# Patient Record
Sex: FEMALE | Race: OTHER | Employment: STUDENT | URBAN - METROPOLITAN AREA
[De-identification: names, ages, dates, MRNs, and addresses within clinical notes are randomized per-mention and may not be internally consistent; named-entity substitution may affect disease eponyms.]

---

## 2022-01-04 ENCOUNTER — HOSPITAL ENCOUNTER (EMERGENCY)
Age: 22
Discharge: HOME | End: 2022-01-04
Attending: PEDIATRICS
Payer: COMMERCIAL

## 2022-01-04 ENCOUNTER — APPOINTMENT (OUTPATIENT)
Dept: CT IMAGING | Age: 22
End: 2022-01-04
Attending: PEDIATRICS
Payer: COMMERCIAL

## 2022-01-04 VITALS
HEART RATE: 123 BPM | OXYGEN SATURATION: 97 % | DIASTOLIC BLOOD PRESSURE: 74 MMHG | WEIGHT: 86.86 LBS | RESPIRATION RATE: 20 BRPM | SYSTOLIC BLOOD PRESSURE: 123 MMHG | TEMPERATURE: 101.4 F

## 2022-01-04 DIAGNOSIS — J03.90 ACUTE TONSILLITIS, UNSPECIFIED ETIOLOGY: Primary | ICD-10-CM

## 2022-01-04 DIAGNOSIS — J36 PERITONSILLAR ABSCESS: ICD-10-CM

## 2022-01-04 LAB
ALBUMIN SERPL-MCNC: 4.2 G/DL (ref 3.5–5)
ALBUMIN/GLOB SERPL: 1 {RATIO} (ref 1.1–2.2)
ALP SERPL-CCNC: 94 U/L (ref 45–117)
ALT SERPL-CCNC: 16 U/L (ref 12–78)
ANION GAP SERPL CALC-SCNC: 7 MMOL/L (ref 5–15)
AST SERPL-CCNC: 17 U/L (ref 15–37)
BASOPHILS # BLD: 0 K/UL (ref 0–0.1)
BASOPHILS NFR BLD: 0 % (ref 0–1)
BILIRUB SERPL-MCNC: 0.7 MG/DL (ref 0.2–1)
BLASTS NFR BLD MANUAL: 0 %
BUN SERPL-MCNC: 9 MG/DL (ref 6–20)
BUN/CREAT SERPL: 11 (ref 12–20)
CALCIUM SERPL-MCNC: 9.8 MG/DL (ref 8.5–10.1)
CHLORIDE SERPL-SCNC: 105 MMOL/L (ref 97–108)
CO2 SERPL-SCNC: 24 MMOL/L (ref 21–32)
COMMENT, HOLDF: NORMAL
CREAT SERPL-MCNC: 0.83 MG/DL (ref 0.55–1.02)
DIFFERENTIAL METHOD BLD: ABNORMAL
EOSINOPHIL # BLD: 0 K/UL (ref 0–0.4)
EOSINOPHIL NFR BLD: 0 % (ref 0–7)
ERYTHROCYTE [DISTWIDTH] IN BLOOD BY AUTOMATED COUNT: 12.9 % (ref 11.5–14.5)
GLOBULIN SER CALC-MCNC: 4.2 G/DL (ref 2–4)
GLUCOSE SERPL-MCNC: 87 MG/DL (ref 65–100)
HCG UR QL: NEGATIVE
HCT VFR BLD AUTO: 42.4 % (ref 35–47)
HETEROPH AB BLD QL IA: NEGATIVE
HGB BLD-MCNC: 14 G/DL (ref 11.5–16)
IMM GRANULOCYTES # BLD AUTO: 0 K/UL
IMM GRANULOCYTES NFR BLD AUTO: 0 %
LIPASE SERPL-CCNC: 57 U/L (ref 73–393)
LYMPHOCYTES # BLD: 1 K/UL (ref 0.8–3.5)
LYMPHOCYTES NFR BLD: 5 % (ref 12–49)
MCH RBC QN AUTO: 29 PG (ref 26–34)
MCHC RBC AUTO-ENTMCNC: 33 G/DL (ref 30–36.5)
MCV RBC AUTO: 87.8 FL (ref 80–99)
METAMYELOCYTES NFR BLD MANUAL: 0 %
MONOCYTES # BLD: 1.2 K/UL (ref 0–1)
MONOCYTES NFR BLD: 6 % (ref 5–13)
MYELOCYTES NFR BLD MANUAL: 0 %
NEUTS BAND NFR BLD MANUAL: 4 % (ref 0–6)
NEUTS SEG # BLD: 17.2 K/UL (ref 1.8–8)
NEUTS SEG NFR BLD: 85 % (ref 32–75)
NRBC # BLD: 0 K/UL (ref 0–0.01)
NRBC BLD-RTO: 0 PER 100 WBC
OTHER CELLS NFR BLD MANUAL: 0 %
PLATELET # BLD AUTO: 276 K/UL (ref 150–400)
PMV BLD AUTO: 9.5 FL (ref 8.9–12.9)
POTASSIUM SERPL-SCNC: 3.4 MMOL/L (ref 3.5–5.1)
PROMYELOCYTES NFR BLD MANUAL: 0 %
PROT SERPL-MCNC: 8.4 G/DL (ref 6.4–8.2)
RBC # BLD AUTO: 4.83 M/UL (ref 3.8–5.2)
RBC MORPH BLD: ABNORMAL
S PYO AG THROAT QL: NEGATIVE
SAMPLES BEING HELD,HOLD: NORMAL
SODIUM SERPL-SCNC: 136 MMOL/L (ref 136–145)
WBC # BLD AUTO: 19.4 K/UL (ref 3.6–11)

## 2022-01-04 PROCEDURE — 74011250636 HC RX REV CODE- 250/636: Performed by: PEDIATRICS

## 2022-01-04 PROCEDURE — 83690 ASSAY OF LIPASE: CPT

## 2022-01-04 PROCEDURE — 96375 TX/PRO/DX INJ NEW DRUG ADDON: CPT

## 2022-01-04 PROCEDURE — 99284 EMERGENCY DEPT VISIT MOD MDM: CPT

## 2022-01-04 PROCEDURE — 87070 CULTURE OTHR SPECIMN AEROBIC: CPT

## 2022-01-04 PROCEDURE — 96368 THER/DIAG CONCURRENT INF: CPT

## 2022-01-04 PROCEDURE — 74011000636 HC RX REV CODE- 636: Performed by: PEDIATRICS

## 2022-01-04 PROCEDURE — 74011250637 HC RX REV CODE- 250/637: Performed by: PEDIATRICS

## 2022-01-04 PROCEDURE — 80053 COMPREHEN METABOLIC PANEL: CPT

## 2022-01-04 PROCEDURE — 36415 COLL VENOUS BLD VENIPUNCTURE: CPT

## 2022-01-04 PROCEDURE — 70491 CT SOFT TISSUE NECK W/DYE: CPT

## 2022-01-04 PROCEDURE — 85027 COMPLETE CBC AUTOMATED: CPT

## 2022-01-04 PROCEDURE — 74011000258 HC RX REV CODE- 258: Performed by: PEDIATRICS

## 2022-01-04 PROCEDURE — 74011000258 HC RX REV CODE- 258: Performed by: STUDENT IN AN ORGANIZED HEALTH CARE EDUCATION/TRAINING PROGRAM

## 2022-01-04 PROCEDURE — 96365 THER/PROPH/DIAG IV INF INIT: CPT

## 2022-01-04 PROCEDURE — 74011250636 HC RX REV CODE- 250/636: Performed by: STUDENT IN AN ORGANIZED HEALTH CARE EDUCATION/TRAINING PROGRAM

## 2022-01-04 PROCEDURE — 81025 URINE PREGNANCY TEST: CPT

## 2022-01-04 PROCEDURE — 74011000250 HC RX REV CODE- 250: Performed by: PEDIATRICS

## 2022-01-04 PROCEDURE — 86664 EPSTEIN-BARR NUCLEAR ANTIGEN: CPT

## 2022-01-04 PROCEDURE — 74011250637 HC RX REV CODE- 250/637: Performed by: STUDENT IN AN ORGANIZED HEALTH CARE EDUCATION/TRAINING PROGRAM

## 2022-01-04 PROCEDURE — 86308 HETEROPHILE ANTIBODY SCREEN: CPT

## 2022-01-04 PROCEDURE — 96361 HYDRATE IV INFUSION ADD-ON: CPT

## 2022-01-04 PROCEDURE — 87880 STREP A ASSAY W/OPTIC: CPT

## 2022-01-04 RX ORDER — CLINDAMYCIN HYDROCHLORIDE 300 MG/1
300 CAPSULE ORAL 4 TIMES DAILY
Qty: 40 CAPSULE | Refills: 0 | Status: SHIPPED | OUTPATIENT
Start: 2022-01-04 | End: 2022-01-05 | Stop reason: SDUPTHER

## 2022-01-04 RX ORDER — HYDROCODONE BITARTRATE AND ACETAMINOPHEN 7.5; 325 MG/15ML; MG/15ML
5 SOLUTION ORAL
Qty: 40 ML | Refills: 0 | Status: SHIPPED | OUTPATIENT
Start: 2022-01-04 | End: 2022-01-06

## 2022-01-04 RX ORDER — HYDROCODONE BITARTRATE AND ACETAMINOPHEN 7.5; 325 MG/15ML; MG/15ML
2.5 SOLUTION ORAL ONCE
Status: COMPLETED | OUTPATIENT
Start: 2022-01-04 | End: 2022-01-04

## 2022-01-04 RX ORDER — KETOROLAC TROMETHAMINE 30 MG/ML
15 INJECTION, SOLUTION INTRAMUSCULAR; INTRAVENOUS
Status: COMPLETED | OUTPATIENT
Start: 2022-01-04 | End: 2022-01-04

## 2022-01-04 RX ORDER — CLINDAMYCIN PHOSPHATE 900 MG/50ML
900 INJECTION INTRAVENOUS
Status: DISCONTINUED | OUTPATIENT
Start: 2022-01-04 | End: 2022-01-04

## 2022-01-04 RX ORDER — ACETAMINOPHEN 500 MG
500 TABLET ORAL
Status: COMPLETED | OUTPATIENT
Start: 2022-01-04 | End: 2022-01-04

## 2022-01-04 RX ORDER — DEXAMETHASONE SODIUM PHOSPHATE 4 MG/ML
16 INJECTION, SOLUTION INTRA-ARTICULAR; INTRALESIONAL; INTRAMUSCULAR; INTRAVENOUS; SOFT TISSUE
Status: DISCONTINUED | OUTPATIENT
Start: 2022-01-04 | End: 2022-01-04 | Stop reason: SDUPTHER

## 2022-01-04 RX ADMIN — CLINDAMYCIN PHOSPHATE 400 MG: 150 INJECTION, SOLUTION INTRAVENOUS at 17:18

## 2022-01-04 RX ADMIN — IOPAMIDOL 100 ML: 612 INJECTION, SOLUTION INTRAVENOUS at 13:00

## 2022-01-04 RX ADMIN — ACETAMINOPHEN 500 MG: 500 TABLET ORAL at 12:38

## 2022-01-04 RX ADMIN — KETOROLAC TROMETHAMINE 15 MG: 30 INJECTION, SOLUTION INTRAMUSCULAR; INTRAVENOUS at 17:58

## 2022-01-04 RX ADMIN — SODIUM CHLORIDE 1000 ML: 9 INJECTION, SOLUTION INTRAVENOUS at 13:19

## 2022-01-04 RX ADMIN — DEXAMETHASONE SODIUM PHOSPHATE 16 MG: 4 INJECTION, SOLUTION INTRAMUSCULAR; INTRAVENOUS at 17:57

## 2022-01-04 RX ADMIN — HYDROCODONE BITARTRATE AND ACETAMINOPHEN 2.5 MG: 7.5; 325 SOLUTION ORAL at 18:30

## 2022-01-04 NOTE — ED NOTES
4:34 PM  Change of shift. Care of patient taken over from Dr. Kong Man; H&P reviewed, bedside handoff complete. Awaiting ENT Dr. Kalani Garcia. Dr. Kalani Garcia returned call about CT findings, with plan for patient to follow up with him in the office tomorrow at 8:30am.    Patient was given clindamycin, decadron, and hycet prior to discharge. Plan to discharge home with short course of hycet for pain control and clindamycin. All available radiology and laboratory results have been reviewed with patient and/or available family. Patient and/or family verbally conveyed their understanding and agreement of the patient's signs, symptoms, diagnosis, treatment and prognosis and additionally agree to follow-up as recommended in the discharge instructions or to return to the Emergency Department should their condition change or worsen prior to their follow-up appointment. All questions have been answered and patient and/or available family express understanding. LABORATORY RESULTS:  Labs Reviewed   CBC WITH MANUAL DIFF - Abnormal; Notable for the following components:       Result Value    WBC 19.4 (*)     NEUTROPHILS 85 (*)     LYMPHOCYTES 5 (*)     ABS. NEUTROPHILS 17.2 (*)     ABS. MONOCYTES 1.2 (*)     All other components within normal limits   METABOLIC PANEL, COMPREHENSIVE - Abnormal; Notable for the following components:    Potassium 3.4 (*)     BUN/Creatinine ratio 11 (*)     Protein, total 8.4 (*)     Globulin 4.2 (*)     A-G Ratio 1.0 (*)     All other components within normal limits   LIPASE - Abnormal; Notable for the following components:    Lipase 57 (*)     All other components within normal limits       IMAGING RESULTS:  CT NECK SOFT TISSUE W CONT   Final Result   1. Findings consistent with bilateral tonsillitis with a small 1.0 x 1.0 cm left   peritonsillar abscess at the inferior aspect of the left palatine tonsil. No   significant narrowing of the airway. Bilateral reactive cervical lymph nodes. MEDICATIONS GIVEN:  Medications   acetaminophen (TYLENOL) tablet 500 mg (500 mg Oral Given 1/4/22 1238)   sodium chloride 0.9 % bolus infusion 1,000 mL (0 mL IntraVENous IV Completed 1/4/22 1922)   iopamidoL (ISOVUE 300) 61 % contrast injection 100 mL (100 mL IntraVENous Given 1/4/22 1300)   clindamycin phosphate (CLEOCIN) 400 mg in 0.9% sodium chloride 50 mL IVPB (0 mg IntraVENous IV Completed 1/4/22 1922)   ketorolac (TORADOL) injection 15 mg (15 mg IntraVENous Given 1/4/22 1758)   dexamethasone (DECADRON) 16 mg in 0.9% sodium chloride 50 mL IVPB (0 mg IntraVENous IV Completed 1/4/22 1923)   HYDROcodone-acetaminophen (HYCET) 0.5-21.7 mg/mL oral solution 2.5 mg (2.5 mg Oral Given 1/4/22 1830)       IMPRESSION:  1. Acute tonsillitis, unspecified etiology    2. Peritonsillar abscess        PLAN:  Follow-up Information     Follow up With Specialties Details Why Contact Info    [address] [facility] DEPT Pediatric Emergency Medicine Go to  If symptoms worsen [address] [facility]    Edison Monroe NP Nurse Practitioner In 2 days      James Amado MD Otolaryngology Go in 1 day 8:30 AM [address] [address]  [phone]           There are no discharge medications for this patient. Taras Martínez MD        Please note that this dictation was completed with Glassmap, the computer voice recognition software. Quite often unanticipated grammatical, syntax, homophones, and other interpretive errors are inadvertently transcribed by the computer software. Please disregard these errors. Please excuse any errors that have escaped final proofreading.

## 2022-01-04 NOTE — ED PROVIDER NOTES
HPI 60-year-old woman with a history of chronic current strep throat and migraine headache disorder presents with 2 days of sore throats with fevers and dysphagia, dysphonia, and drooling. She has been more tired and has a halitosis. She is had no cough or congestion, no vomiting or diarrhea. History reviewed. No pertinent past medical history. Chronic recurrent strep throat  History reviewed. No pertinent surgical history. History reviewed. No pertinent family history. Social History     Socioeconomic History    Marital status: SINGLE     Spouse name: Not on file    Number of children: Not on file    Years of education: Not on file    Highest education level: Not on file   Occupational History    Not on file   Tobacco Use    Smoking status: Never Smoker    Smokeless tobacco: Never Used   Substance and Sexual Activity    Alcohol use: Yes    Drug use: Never    Sexual activity: Not on file   Other Topics Concern    Not on file   Social History Narrative    Not on file     Social Determinants of Health     Financial Resource Strain:     Difficulty of Paying Living Expenses: Not on file   Food Insecurity:     Worried About Running Out of Food in the Last Year: Not on file    Jeferson of Food in the Last Year: Not on file   Transportation Needs:     Lack of Transportation (Medical): Not on file    Lack of Transportation (Non-Medical):  Not on file   Physical Activity:     Days of Exercise per Week: Not on file    Minutes of Exercise per Session: Not on file   Stress:     Feeling of Stress : Not on file   Social Connections:     Frequency of Communication with Friends and Family: Not on file    Frequency of Social Gatherings with Friends and Family: Not on file    Attends [employer] Services: Not on file    Active Member of Clubs or Organizations: Not on file    Attends Club or Organization Meetings: Not on file    Marital Status: Not on file   Intimate Partner Violence:     Fear of Current or Ex-Partner: Not on file    Emotionally Abused: Not on file    Physically Abused: Not on file    Sexually Abused: Not on file   Housing Stability:     Unable to Pay for Housing in the Last Year: Not on file    Number of Places Lived in the Last Year: Not on file    Unstable Housing in the Last Year: Not on file   Medications: None  Immunizations: Up-to-date, has had Covid vaccine and booster  Social history: Patient drinks occasional alcohol but does not smoke or use drugs    ALLERGIES: Amoxicillin    Review of Systems   Constitutional: Positive for fever. HENT: Positive for drooling, sore throat and trouble swallowing. Negative for congestion and rhinorrhea. Respiratory: Negative for cough. Gastrointestinal: Negative for diarrhea and vomiting. All other systems reviewed and are negative. Vitals:    01/04/22 1203   BP: 122/81   Pulse: (!) 136   Resp: 18   Temp: (!) 100.7 °F (38.2 °C)   SpO2: 98%   Weight: 39.4 kg (86 lb 13.8 oz)            Physical Exam  Vitals and nursing note reviewed. Constitutional:       General: She is not in acute distress. Appearance: She is well-developed. She is not ill-appearing, toxic-appearing or diaphoretic. HENT:      Head: Normocephalic and atraumatic. Nose: No congestion or rhinorrhea. Mouth/Throat:      Mouth: Mucous membranes are moist.      Tonsils: Tonsillar exudate present. 2+ on the right. 2+ on the left. Eyes:      Conjunctiva/sclera: Conjunctivae normal.   Cardiovascular:      Rate and Rhythm: Regular rhythm. Tachycardia present. Heart sounds: Normal heart sounds. No murmur heard. No friction rub. No gallop. Pulmonary:      Effort: Pulmonary effort is normal. No respiratory distress. Breath sounds: Normal breath sounds. No stridor. No wheezing, rhonchi or rales. Abdominal:      General: Bowel sounds are normal. There is no distension. Palpations: Abdomen is soft. Tenderness:  There is no abdominal tenderness. Musculoskeletal:      Cervical back: Normal range of motion. Lymphadenopathy:      Cervical: No cervical adenopathy. Skin:     General: Skin is warm. Neurological:      General: No focal deficit present. Mental Status: She is alert. Psychiatric:         Mood and Affect: Mood normal.          MDM  Number of Diagnoses or Management Options  Diagnosis management comments: 60-year-old woman with chronic recurrent pharyngitis who presents with pharyngitis, drooling, halitosis, dysphagia, dysphonia concerning for the possibility of a peritonsillar abscess versus mononucleosis versus strep throat. Given the extent of her tachycardia and drooling it is reasonable to obtain a CT of soft tissues of the neck to evaluate for abscess while obtaining point-of-care strep test, mononucleosis screen to include Rajat-Barr virus panel, and screening labs. Give IV fluid bolus and reassess. Labs Reviewed   CBC WITH MANUAL DIFF - Abnormal; Notable for the following components:       Result Value    WBC 19.4 (*)     NEUTROPHILS 85 (*)     LYMPHOCYTES 5 (*)     ABS. NEUTROPHILS 17.2 (*)     ABS. MONOCYTES 1.2 (*)     All other components within normal limits   METABOLIC PANEL, COMPREHENSIVE - Abnormal; Notable for the following components:    Potassium 3.4 (*)     BUN/Creatinine ratio 11 (*)     Protein, total 8.4 (*)     Globulin 4.2 (*)     A-G Ratio 1.0 (*)     All other components within normal limits   LIPASE - Abnormal; Notable for the following components:    Lipase 57 (*)     All other components within normal limits   CULTURE, THROAT   SAMPLES BEING HELD   MONONUCLEOSIS SCREEN   RAJAT BARR VIRUS AB PANEL   POC GROUP A STREP   HCG URINE, QL. - POC     CT NECK SOFT TISSUE W CONT   Final Result   1. Findings consistent with bilateral tonsillitis with a small 1.0 x 1.0 cm left   peritonsillar abscess at the inferior aspect of the left palatine tonsil. No   significant narrowing of the airway. Bilateral reactive cervical lymph nodes. 4:02 PM  Patient with small peritonsillar abscess, treat with 400 mg of IV Clindamycin, will discuss with ENT regarding possible followup.        Procedures

## 2022-01-04 NOTE — ED TRIAGE NOTES
Triage: 2 days of sore throat. Fevers that started on Sunday. History of chronic strep.  Reports it is hard and painful to swallow and she has had more secretions than normal. Took 400mg Advil at 9am.

## 2022-01-04 NOTE — ED NOTES
Pt given yaunker to suction secretions from mouth. Pt noted to have frequent saliva and states it hurts to have to spit.

## 2022-01-05 LAB
EBV NA IGG SER-ACNC: 237 U/ML (ref 0–17.9)
EBV VCA IGG SER-ACNC: 32.6 U/ML (ref 0–17.9)
EBV VCA IGM SER-ACNC: <36 U/ML (ref 0–35.9)
INTERPRETATION, 169995: ABNORMAL

## 2022-01-05 RX ORDER — CLINDAMYCIN HYDROCHLORIDE 300 MG/1
300 CAPSULE ORAL 4 TIMES DAILY
Qty: 40 CAPSULE | Refills: 0 | Status: SHIPPED | OUTPATIENT
Start: 2022-01-05 | End: 2022-01-15

## 2022-01-05 NOTE — ED NOTES
Mother called for concern of her daughter's prescription, was not called in.  MD [facility] aware and will send in the Clindamycin to pharmacy on file, [place]

## 2022-01-05 NOTE — ED NOTES
Pt discharged home with parent/guardian. Pt acting age appropriately, respirations regular and unlabored, cap refill less than two seconds. Skin pink, dry and warm. Lungs clear bilaterally. No further complaints at this time. Parent/guardian verbalized understanding of discharge paperwork and has no further questions at this time. Education provided about continuation of care, follow up care and medication administration: . Parent/guardian able to provided teach back about discharge instructions.     Dr. William Shen aware of HR/temperature

## 2022-01-06 LAB
BACTERIA SPEC CULT: NORMAL
SERVICE CMNT-IMP: NORMAL